# Patient Record
Sex: MALE | Employment: FULL TIME | URBAN - METROPOLITAN AREA
[De-identification: names, ages, dates, MRNs, and addresses within clinical notes are randomized per-mention and may not be internally consistent; named-entity substitution may affect disease eponyms.]

---

## 2017-10-20 LAB
CRC RECOMMENDATION EXT: NORMAL
TISSUE SPECIMEN TO PATHOLOGY, GASTRO: NORMAL

## 2023-08-26 ENCOUNTER — CLINICAL SUPPORT (OUTPATIENT)
Dept: OTHER | Facility: CLINIC | Age: 58
End: 2023-08-26
Payer: COMMERCIAL

## 2023-08-26 DIAGNOSIS — Z00.8 ENCOUNTER FOR OTHER GENERAL EXAMINATION: ICD-10-CM

## 2023-08-26 PROCEDURE — 82947 ASSAY GLUCOSE BLOOD QUANT: CPT | Mod: QW,S$GLB,, | Performed by: INTERNAL MEDICINE

## 2023-08-26 PROCEDURE — 80061 LIPID PANEL: CPT | Mod: QW,S$GLB,, | Performed by: INTERNAL MEDICINE

## 2023-08-26 PROCEDURE — 80061 PR  LIPID PANEL: ICD-10-PCS | Mod: QW,S$GLB,, | Performed by: INTERNAL MEDICINE

## 2023-08-26 PROCEDURE — 82947 PR  ASSAY QUANTITATIVE,BLOOD GLUCOSE: ICD-10-PCS | Mod: QW,S$GLB,, | Performed by: INTERNAL MEDICINE

## 2023-08-26 PROCEDURE — 99401 PR PREVENT COUNSEL,INDIV,15 MIN: ICD-10-PCS | Mod: S$GLB,,, | Performed by: INTERNAL MEDICINE

## 2023-08-26 PROCEDURE — 99401 PREV MED CNSL INDIV APPRX 15: CPT | Mod: S$GLB,,, | Performed by: INTERNAL MEDICINE

## 2023-09-13 VITALS
SYSTOLIC BLOOD PRESSURE: 149 MMHG | HEIGHT: 74 IN | BODY MASS INDEX: 27.98 KG/M2 | WEIGHT: 218 LBS | DIASTOLIC BLOOD PRESSURE: 90 MMHG

## 2023-09-13 LAB
GLUCOSE SERPL-MCNC: 92 MG/DL (ref 60–140)
HDLC SERPL-MCNC: 21 MG/DL
POC CHOLESTEROL, LDL (DOCK): 95 MG/DL
POC CHOLESTEROL, TOTAL: 139 MG/DL
TRIGL SERPL-MCNC: 124 MG/DL

## 2024-11-11 ENCOUNTER — OFFICE VISIT (OUTPATIENT)
Dept: FAMILY MEDICINE | Facility: CLINIC | Age: 59
End: 2024-11-11
Payer: COMMERCIAL

## 2024-11-11 VITALS
BODY MASS INDEX: 28.07 KG/M2 | RESPIRATION RATE: 16 BRPM | HEIGHT: 74 IN | TEMPERATURE: 99 F | DIASTOLIC BLOOD PRESSURE: 78 MMHG | OXYGEN SATURATION: 99 % | HEART RATE: 105 BPM | WEIGHT: 218.69 LBS | SYSTOLIC BLOOD PRESSURE: 128 MMHG

## 2024-11-11 DIAGNOSIS — G56.01 CARPAL TUNNEL SYNDROME OF RIGHT WRIST: ICD-10-CM

## 2024-11-11 DIAGNOSIS — M54.2 NECK PAIN, CHRONIC: ICD-10-CM

## 2024-11-11 DIAGNOSIS — M25.511 CHRONIC RIGHT SHOULDER PAIN: ICD-10-CM

## 2024-11-11 DIAGNOSIS — M25.522 LEFT ELBOW PAIN: ICD-10-CM

## 2024-11-11 DIAGNOSIS — M54.12 CERVICAL RADICULOPATHY: ICD-10-CM

## 2024-11-11 DIAGNOSIS — G89.29 NECK PAIN, CHRONIC: ICD-10-CM

## 2024-11-11 DIAGNOSIS — G89.29 CHRONIC RIGHT SHOULDER PAIN: ICD-10-CM

## 2024-11-11 DIAGNOSIS — Z00.00 HEALTHCARE MAINTENANCE: Primary | ICD-10-CM

## 2024-11-11 DIAGNOSIS — I83.91 VARICOSE VEINS OF RIGHT LOWER EXTREMITY, UNSPECIFIED WHETHER COMPLICATED: ICD-10-CM

## 2024-11-11 PROCEDURE — 99396 PREV VISIT EST AGE 40-64: CPT | Mod: S$GLB,,, | Performed by: FAMILY MEDICINE

## 2024-11-11 PROCEDURE — 1159F MED LIST DOCD IN RCRD: CPT | Mod: CPTII,S$GLB,, | Performed by: FAMILY MEDICINE

## 2024-11-11 PROCEDURE — 3008F BODY MASS INDEX DOCD: CPT | Mod: CPTII,S$GLB,, | Performed by: FAMILY MEDICINE

## 2024-11-11 PROCEDURE — 3078F DIAST BP <80 MM HG: CPT | Mod: CPTII,S$GLB,, | Performed by: FAMILY MEDICINE

## 2024-11-11 PROCEDURE — 99213 OFFICE O/P EST LOW 20 MIN: CPT | Mod: 25,S$GLB,, | Performed by: FAMILY MEDICINE

## 2024-11-11 PROCEDURE — 3074F SYST BP LT 130 MM HG: CPT | Mod: CPTII,S$GLB,, | Performed by: FAMILY MEDICINE

## 2024-11-11 PROCEDURE — 99999 PR PBB SHADOW E&M-EST. PATIENT-LVL III: CPT | Mod: PBBFAC,,, | Performed by: FAMILY MEDICINE

## 2024-11-11 RX ORDER — WITCH HAZEL 50 %
2000 PADS, MEDICATED (EA) TOPICAL DAILY
COMMUNITY

## 2024-11-11 RX ORDER — AMOXICILLIN 500 MG
CAPSULE ORAL DAILY
COMMUNITY

## 2024-11-11 RX ORDER — MELOXICAM 15 MG/1
15 TABLET ORAL DAILY PRN
Qty: 30 TABLET | Refills: 2 | Status: SHIPPED | OUTPATIENT
Start: 2024-11-11

## 2024-11-11 RX ORDER — MULTIVITAMIN
TABLET ORAL DAILY
COMMUNITY

## 2024-11-12 ENCOUNTER — LAB VISIT (OUTPATIENT)
Dept: LAB | Facility: HOSPITAL | Age: 59
End: 2024-11-12
Attending: INTERNAL MEDICINE
Payer: COMMERCIAL

## 2024-11-12 DIAGNOSIS — Z00.00 HEALTHCARE MAINTENANCE: ICD-10-CM

## 2024-11-12 LAB
ALBUMIN SERPL BCP-MCNC: 4.6 G/DL (ref 3.5–5.2)
ALP SERPL-CCNC: 57 U/L (ref 55–135)
ALT SERPL W/O P-5'-P-CCNC: 29 U/L (ref 10–44)
ANION GAP SERPL CALC-SCNC: 7 MMOL/L (ref 8–16)
AST SERPL-CCNC: 24 U/L (ref 10–40)
BASOPHILS # BLD AUTO: 0.06 K/UL (ref 0–0.2)
BASOPHILS NFR BLD: 0.9 % (ref 0–1.9)
BILIRUB SERPL-MCNC: 1.5 MG/DL (ref 0.1–1)
BUN SERPL-MCNC: 16 MG/DL (ref 6–20)
CALCIUM SERPL-MCNC: 9.7 MG/DL (ref 8.7–10.5)
CHLORIDE SERPL-SCNC: 103 MMOL/L (ref 95–110)
CHOLEST SERPL-MCNC: 170 MG/DL (ref 120–199)
CHOLEST/HDLC SERPL: 4.1 {RATIO} (ref 2–5)
CO2 SERPL-SCNC: 27 MMOL/L (ref 23–29)
CREAT SERPL-MCNC: 1.2 MG/DL (ref 0.5–1.4)
DIFFERENTIAL METHOD BLD: ABNORMAL
EOSINOPHIL # BLD AUTO: 0.3 K/UL (ref 0–0.5)
EOSINOPHIL NFR BLD: 5 % (ref 0–8)
ERYTHROCYTE [DISTWIDTH] IN BLOOD BY AUTOMATED COUNT: 12.1 % (ref 11.5–14.5)
EST. GFR  (NO RACE VARIABLE): >60 ML/MIN/1.73 M^2
ESTIMATED AVG GLUCOSE: 108 MG/DL (ref 68–131)
GLUCOSE SERPL-MCNC: 105 MG/DL (ref 70–110)
HBA1C MFR BLD: 5.4 % (ref 4.5–6.2)
HCT VFR BLD AUTO: 44 % (ref 40–54)
HDLC SERPL-MCNC: 41 MG/DL (ref 40–75)
HDLC SERPL: 24.1 % (ref 20–50)
HGB BLD-MCNC: 15.2 G/DL (ref 14–18)
IMM GRANULOCYTES # BLD AUTO: 0.01 K/UL (ref 0–0.04)
IMM GRANULOCYTES NFR BLD AUTO: 0.2 % (ref 0–0.5)
LDLC SERPL CALC-MCNC: 96.6 MG/DL (ref 63–159)
LYMPHOCYTES # BLD AUTO: 2.5 K/UL (ref 1–4.8)
LYMPHOCYTES NFR BLD: 37.4 % (ref 18–48)
MCH RBC QN AUTO: 31.1 PG (ref 27–31)
MCHC RBC AUTO-ENTMCNC: 34.5 G/DL (ref 32–36)
MCV RBC AUTO: 90 FL (ref 82–98)
MONOCYTES # BLD AUTO: 0.9 K/UL (ref 0.3–1)
MONOCYTES NFR BLD: 13.9 % (ref 4–15)
NEUTROPHILS # BLD AUTO: 2.8 K/UL (ref 1.8–7.7)
NEUTROPHILS NFR BLD: 42.6 % (ref 38–73)
NONHDLC SERPL-MCNC: 129 MG/DL
NRBC BLD-RTO: 0 /100 WBC
PLATELET # BLD AUTO: 230 K/UL (ref 150–450)
PMV BLD AUTO: 9.9 FL (ref 9.2–12.9)
POTASSIUM SERPL-SCNC: 4.6 MMOL/L (ref 3.5–5.1)
PROT SERPL-MCNC: 7.7 G/DL (ref 6–8.4)
RBC # BLD AUTO: 4.89 M/UL (ref 4.6–6.2)
SODIUM SERPL-SCNC: 137 MMOL/L (ref 136–145)
TRIGL SERPL-MCNC: 162 MG/DL (ref 30–150)
WBC # BLD AUTO: 6.61 K/UL (ref 3.9–12.7)

## 2024-11-12 PROCEDURE — 80061 LIPID PANEL: CPT | Performed by: FAMILY MEDICINE

## 2024-11-12 PROCEDURE — 83036 HEMOGLOBIN GLYCOSYLATED A1C: CPT | Performed by: FAMILY MEDICINE

## 2024-11-12 PROCEDURE — 80053 COMPREHEN METABOLIC PANEL: CPT | Performed by: FAMILY MEDICINE

## 2024-11-12 PROCEDURE — 85025 COMPLETE CBC W/AUTO DIFF WBC: CPT | Performed by: FAMILY MEDICINE

## 2024-11-12 NOTE — PROGRESS NOTES
Subjective:   Patient ID: Hudson Lewis is a 59 y.o. male     Chief Complaint:Establish Care      Here for checkup. Multiple complaints today. Notes shoulder, wrist, elbow, and neck pain. Also reports varicose veins to right calf.       Review of Systems   Respiratory:  Negative for shortness of breath.    Cardiovascular:  Negative for chest pain.   Gastrointestinal:  Negative for abdominal pain.   Genitourinary:  Negative for dysuria.   Musculoskeletal:  Positive for arthralgias.     History reviewed. No pertinent past medical history.  History reviewed. No pertinent surgical history.  Objective:     Vitals:    11/11/24 0910   BP: 128/78   Pulse: 105   Resp: 16   Temp: 98.9 °F (37.2 °C)     Body mass index is 28.08 kg/m².  Physical Exam  Vitals and nursing note reviewed.   Constitutional:       Appearance: He is well-developed.   HENT:      Head: Normocephalic and atraumatic.   Eyes:      General: No scleral icterus.     Conjunctiva/sclera: Conjunctivae normal.   Cardiovascular:      Heart sounds: No murmur heard.  Pulmonary:      Effort: Pulmonary effort is normal. No respiratory distress.   Musculoskeletal:         General: No deformity. Normal range of motion.      Cervical back: Normal range of motion and neck supple.   Skin:     Coloration: Skin is not pale.      Findings: No rash.   Neurological:      Mental Status: He is alert and oriented to person, place, and time.   Psychiatric:         Behavior: Behavior normal.         Thought Content: Thought content normal.         Judgment: Judgment normal.     Moderate decreased ROM on right shoulder.  Assessment:     1. Healthcare maintenance    2. Varicose veins of right lower extremity, unspecified whether complicated    3. Left elbow pain    4. Chronic right shoulder pain    5. Neck pain, chronic    6. Cervical radiculopathy    7. Carpal tunnel syndrome of right wrist      Plan:   Healthcare maintenance  -     Comprehensive Metabolic Panel; Future;  Expected date: 11/11/2024  -     Hemoglobin A1C; Future; Expected date: 11/11/2024  -     Lipid Panel; Future; Expected date: 11/11/2024  -     CBC Auto Differential; Future; Expected date: 11/11/2024    Varicose veins of right lower extremity, unspecified whether complicated  Counseled at length. Recommended US but declined. Understood an accepted risks. Counseled on management options  Left elbow pain  -     meloxicam (MOBIC) 15 MG tablet; Take 1 tablet (15 mg total) by mouth daily as needed for Pain.  Dispense: 30 tablet; Refill: 2    Chronic right shoulder pain  -     meloxicam (MOBIC) 15 MG tablet; Take 1 tablet (15 mg total) by mouth daily as needed for Pain.  Dispense: 30 tablet; Refill: 2    Neck pain, chronic  -     meloxicam (MOBIC) 15 MG tablet; Take 1 tablet (15 mg total) by mouth daily as needed for Pain.  Dispense: 30 tablet; Refill: 2    Cervical radiculopathy  Counseled. Offered PT but declined  Carpal tunnel syndrome of right wrist  counseled          Total time spent of 60 minutes minutes on the day of the visit.This includes face to face time and preparing to see the patient, obtaining and reviewing separately obtained history, documenting clinical information in the electronic or other health record, independently interpreting results and communicating results to the patient/family/caregiver, or care coordinator.    Established patient with me has been instructed that must see me at least 1 time yearly (every 365 days) for refills of medications. Seeing other providers in this clinic is fine but expectation is to see me yearly.    Clint Phillips MD  11/12/2024    Portions of this note have been dictated with MICHAEL Roque

## 2024-11-14 ENCOUNTER — PATIENT OUTREACH (OUTPATIENT)
Dept: ADMINISTRATIVE | Facility: HOSPITAL | Age: 59
End: 2024-11-14
Payer: COMMERCIAL

## 2024-11-14 NOTE — PROGRESS NOTES
"Population Health Chart Review & Patient Outreach Details      Additional [facility] Notes:    Chart Routed "CC'd" from Clint Phillips MD to retrieve and upload external Report    [facility] didn't have patient seen with Dr. Ordonez.  Reached out to patient           Updates Requested / Reviewed:      Care Everywhere, , and External Sources: [facility]         Health Maintenance Topics Overdue:      VB Score: 1     Colon Cancer Screening                       Health Maintenance Topic(s) Outreach Outcomes & Actions Taken:    Colorectal Cancer Screening - Outreach Outcomes & Actions Taken  : msg      "

## 2024-12-03 ENCOUNTER — PATIENT MESSAGE (OUTPATIENT)
Dept: FAMILY MEDICINE | Facility: CLINIC | Age: 59
End: 2024-12-03
Payer: COMMERCIAL

## 2024-12-04 ENCOUNTER — PATIENT MESSAGE (OUTPATIENT)
Dept: ADMINISTRATIVE | Facility: HOSPITAL | Age: 59
End: 2024-12-04
Payer: COMMERCIAL

## 2024-12-10 ENCOUNTER — PATIENT OUTREACH (OUTPATIENT)
Dept: ADMINISTRATIVE | Facility: HOSPITAL | Age: 59
End: 2024-12-10
Payer: COMMERCIAL

## 2024-12-11 ENCOUNTER — TELEPHONE (OUTPATIENT)
Dept: FAMILY MEDICINE | Facility: CLINIC | Age: 59
End: 2024-12-11
Payer: COMMERCIAL

## 2024-12-11 NOTE — TELEPHONE ENCOUNTER
----- Message from Juli sent at 12/11/2024  9:45 AM CST -----  Regarding: return call  Contact: Russel bustamante  Type:  Patient Returning Call    Who Called:  Russel spouse  Who Left Message for Patient:  Alana  Does the patient know what this is regarding?:  fax  Best Call Back Number:  [phone]  Additional Information:  Spouse states she faxed over the colonoscopy report.  Please call spouse to advise.  Thanks!

## 2024-12-11 NOTE — TELEPHONE ENCOUNTER
----- Message from Radha sent at 12/11/2024  4:20 PM CST -----  Regarding: Call back  Type:  Patient Returning Call    Who Called:Pt Wife    Who Left Message for Patient:Phoebe    Does the patient know what this is regarding?:n/a    Would the patient rather a call back or a response via [facility]? Call back    Best Call Back Number:[phone]    Additional Information: Pt Wife requesting a call back. Thank you

## 2024-12-11 NOTE — TELEPHONE ENCOUNTER
Reviewed the following message with patient's wife:      Phoebe Leary LPN to Hudson Lewis         12/11/24  4:17 PM  Good evening,   We were unsuccessful reaching you by phone regarding the colonoscopy fax. Nothing has been received by fax from you or Dr. Ordonez so far. Another option would be to upload the report to your MyChart. I believe if you take a photo of the report, you can submit it as an attachment. Please let us know if there's anything else we can do.  Thank you,   THEO Sandhu      Patient's wife stated the report is approximately 50 pages long.  States will drop off a copy of report to office on Friday 12/13/24.

## 2024-12-12 ENCOUNTER — TELEPHONE (OUTPATIENT)
Dept: FAMILY MEDICINE | Facility: CLINIC | Age: 59
End: 2024-12-12
Payer: COMMERCIAL

## 2024-12-12 NOTE — TELEPHONE ENCOUNTER
"Patient presents to office to bring proof of colonoscopy. Noted procedure date:10/20/2017. Per Dr. Ordonez, "Repeat colonoscopy in 5 years time. If this polyp proves to be a hyperplastic polyp, repeat colonoscopy would not be due for 10 years."  Report submitted to [facility].   "

## 2024-12-24 ENCOUNTER — PATIENT OUTREACH (OUTPATIENT)
Dept: ADMINISTRATIVE | Facility: HOSPITAL | Age: 59
End: 2024-12-24
Payer: COMMERCIAL